# Patient Record
Sex: MALE | Race: WHITE | NOT HISPANIC OR LATINO | URBAN - METROPOLITAN AREA
[De-identification: names, ages, dates, MRNs, and addresses within clinical notes are randomized per-mention and may not be internally consistent; named-entity substitution may affect disease eponyms.]

---

## 2023-05-27 ENCOUNTER — EMERGENCY (EMERGENCY)
Facility: HOSPITAL | Age: 38
LOS: 1 days | Discharge: HOME | End: 2023-05-27
Attending: EMERGENCY MEDICINE | Admitting: EMERGENCY MEDICINE
Payer: MEDICAID

## 2023-05-27 VITALS
RESPIRATION RATE: 18 BRPM | SYSTOLIC BLOOD PRESSURE: 137 MMHG | DIASTOLIC BLOOD PRESSURE: 88 MMHG | HEIGHT: 67 IN | WEIGHT: 199.96 LBS | TEMPERATURE: 98 F | OXYGEN SATURATION: 99 % | HEART RATE: 85 BPM

## 2023-05-27 PROCEDURE — 99283 EMERGENCY DEPT VISIT LOW MDM: CPT | Mod: 25

## 2023-05-27 PROCEDURE — 23650 CLTX SHO DSLC W/MNPJ WO ANES: CPT | Mod: 54

## 2023-05-27 PROCEDURE — 73030 X-RAY EXAM OF SHOULDER: CPT | Mod: 26,LT,76

## 2023-05-27 PROCEDURE — 23650 CLTX SHO DSLC W/MNPJ WO ANES: CPT | Mod: LT

## 2023-05-27 PROCEDURE — 73030 X-RAY EXAM OF SHOULDER: CPT

## 2023-05-27 PROCEDURE — 99284 EMERGENCY DEPT VISIT MOD MDM: CPT | Mod: 57

## 2023-05-27 RX ORDER — IBUPROFEN 200 MG
600 TABLET ORAL ONCE
Refills: 0 | Status: COMPLETED | OUTPATIENT
Start: 2023-05-27 | End: 2023-05-27

## 2023-05-27 RX ORDER — OXYCODONE HYDROCHLORIDE 5 MG/1
10 TABLET ORAL ONCE
Refills: 0 | Status: DISCONTINUED | OUTPATIENT
Start: 2023-05-27 | End: 2023-05-27

## 2023-05-27 RX ORDER — ONDANSETRON 8 MG/1
4 TABLET, FILM COATED ORAL ONCE
Refills: 0 | Status: COMPLETED | OUTPATIENT
Start: 2023-05-27 | End: 2023-05-27

## 2023-05-27 RX ADMIN — OXYCODONE HYDROCHLORIDE 10 MILLIGRAM(S): 5 TABLET ORAL at 07:38

## 2023-05-27 RX ADMIN — Medication 600 MILLIGRAM(S): at 10:18

## 2023-05-27 RX ADMIN — Medication 600 MILLIGRAM(S): at 07:38

## 2023-05-27 RX ADMIN — OXYCODONE HYDROCHLORIDE 10 MILLIGRAM(S): 5 TABLET ORAL at 10:18

## 2023-05-27 RX ADMIN — ONDANSETRON 4 MILLIGRAM(S): 8 TABLET, FILM COATED ORAL at 07:40

## 2023-05-27 NOTE — ED PROVIDER NOTE - NSFOLLOWUPINSTRUCTIONS_ED_ALL_ED_FT
Follow up with orthopedics, call to make appointment.    A shoulder dislocation happens when the upper arm bone (humerus) moves out of the shoulder joint. The shoulder joint is the part of the shoulder where the humerus, shoulder blade (scapula), and collarbone (clavicle) meet.    What are the causes?  This condition is often caused by:  A fall.  A hard, direct hit to the shoulder.  A forceful movement of the shoulder.  What increases the risk?  You are more likely to develop this condition if you play sports.    What are the signs or symptoms?  The upper body showing a dislocated shoulder.  Symptoms of this condition include:  Deformity of the shoulder.  Severe pain.  Inability to move the shoulder.  Numbness, weakness, or tingling in your neck or down your arm.  Bruising or swelling around your shoulder.  How is this diagnosed?  This condition is diagnosed with a physical exam. After the exam, tests may be done to check for related problems. Tests may include:  X-ray. This may be done to check for broken bones.  MRI. This may be done to check for damage to the tissues around the shoulder.  Electromyogram. This may be done to check for nerve damage.  How is this treated?  This condition is treated with a procedure to place the humerus back in the joint. This procedure is called a reduction. There are two types of reduction:  Closed reduction. The humerus is placed back in the joint without surgery. The health care provider uses his or her hands to guide the bone back into place.  Open reduction. Surgery is done to place the humerus back in the joint. An open reduction may be recommended if:  You have a weak shoulder joint or weak ligaments.  You have had more than one shoulder dislocation.  The nerves or blood vessels around your shoulder have been damaged.  After reduction, your shoulder and arm will be placed in a brace or sling to prevent it from moving.    After the brace or sling is removed, you will have physical therapy to help improve the range of motion in your shoulder joint.    Follow these instructions at home:  Medicines    Take over-the-counter and prescription medicines only as told by your health care provider.  Ask your health care provider if the medicine prescribed to you:  Requires you to avoid driving or using machinery.  Can cause constipation. You may need to take these actions to prevent or treat constipation:  Drink enough fluid to keep your urine pale yellow.  Take over-the-counter or prescription medicines.  Eat foods that are high in fiber, such as beans, whole grains, and fresh fruits and vegetables.  Limit foods that are high in fat and processed sugars, such as fried or sweet foods.  If you have a brace or sling:    Wear the brace or sling as told by your health care provider. Remove it only as told by your health care provider.  Loosen the brace or sling if your fingers tingle, become numb, or turn cold and blue.  Keep the brace or sling clean.  If the brace or sling is not waterproof:  Do not let it get wet.  Cover it with a watertight covering when you take a bath or shower.  Managing pain, stiffness, and swelling    Bag of ice on a towel on the skin.  If directed, put ice on the injured area.  If you have a removable brace or sling, remove it as told by your health care provider.  Put ice in a plastic bag.  Place a towel between your skin and the bag.  Leave the ice on for 20 minutes, 2–3 times per day.  Move your fingers often to reduce stiffness and swelling.  Raise (elevate) the injured area above the level of your heart while you are sitting or lying down.  Activity    Do not lift your arm above shoulder level until your health care provider approves.  Do not lift anything until your health care provider says that it is safe.  Do not push or pull things until your health care provider approves.  Return to your normal activities as told by your health care provider. Ask your health care provider what activities are safe for you.  Perform range-of-motion exercises only as told by your health care provider.  Exercise your hand by squeezing a soft ball. This helps to decrease stiffness and swelling in your hand and wrist.  General instructions    Do not drive while wearing a brace or sling on a hand that you use for driving. Ask your health care provider when it is safe to drive after the brace or sling is removed.  Do not take baths, swim, or use a hot tub until your health care provider approves. Ask your health care provider if you may take showers. You may only be allowed to take sponge baths.  Do not use any products that contain nicotine or tobacco, such as cigarettes, e-cigarettes, and chewing tobacco. These can delay healing. If you need help quitting, ask your health care provider.  Keep all follow-up visits as told by your health care provider. This is important.  Contact a health care provider if:  Your brace or sling gets damaged.  Get help right away if:  Your pain gets worse rather than better.  You lose feeling in your arm or hand.  Your arm or hand becomes white and cold.  Summary  A shoulder dislocation happens when the upper arm bone moves out of the shoulder joint.  It is usually caused by a fall, a strong hit to the shoulder, or a forceful movement of the shoulder.  It causes severe pain, inability to move the shoulder, numbness, weakness, or tingling.  This condition is treated with either closed or open reduction. You will also be given a brace or sling. You will do exercises to increase your shoulder's range of motion.  Contact a health care provider if your brace or sling gets damaged. Get help right away if your pain gets worse, you lose feeling in your arm or hand, or your arm or hand becomes white or cold.  This information is not intended to replace advice given to you by your health care provider. Make sure you discuss any questions you have with your health care provider.    Document Revised: 09/07/2022 Document Reviewed: 09/07/2022

## 2023-05-27 NOTE — ED PROVIDER NOTE - PROGRESS NOTE DETAILS
patient feeling better, shoulder reduced, told to f/u with orthopedics, no heavy lifting, wear sling

## 2023-05-27 NOTE — ED ADULT TRIAGE NOTE - GLASGOW COMA SCALE: BEST VERBAL RESPONSE, MLM
Left message for Gavin Garcia at [facility] ([phone]) regarding approval needed by the pharmacy for methotrexate. Left pharmacy information. Call office back with questions. (V5) oriented

## 2023-05-27 NOTE — ED ADULT NURSE NOTE - OBJECTIVE STATEMENT
the patient presents to the er with complaints of left shoulder pain.  he lifted a large water bottle and felt his left shoulder pop out.  denies prior injury.

## 2023-05-27 NOTE — ED ADULT TRIAGE NOTE - STATUS:
REPORT RECEIVED FROM ER NURSE.PT ARRIVED TO ROOM 103 VIA STRETCHER FROM ER. PT IN PAIN 6/10. 
PT ORIENTED TO ROOM, USE OF CALL BELL. POLICIES AND PROCEDURES EXPLAINED. PT VERBALIZED 
UNDERSTANDING. A/O X 3-4. ON ROOM AIR. O2 SAT AT 98%. PT HAS AN IV ON RT HAND. NEEDS 
ATTENDED. WILL ENDORSE TO NEXT SHIFT NURSE FOR CONTINUITY OF CARE. Applied

## 2023-05-27 NOTE — ED ADULT NURSE NOTE - CHIEF COMPLAINT QUOTE
39 y/o male received ambulatory to triage. Alert and oriented x4. States "I fell onto my left arm because I picked up big gallons of water and I think I dislocated my shoulder." Pt denies any LOC or hitting head. Denies any cp, sob, n/v/d, dizziness, headache, fever/chills.

## 2023-05-27 NOTE — ED PROVIDER NOTE - CARE PROVIDER_API CALL
Bam Jurado  Orthopaedic Surgery  [address], [address]  [place] [address]  Phone: [phone]  Fax: [phone]  Follow Up Time:

## 2023-05-27 NOTE — ED PROVIDER NOTE - PHYSICAL EXAMINATION
Patient able to flex and extend wrist, able to AB duct thumb, able to oppose index finger and thumb and pinky finger and thumb, sensation intact

## 2023-05-27 NOTE — ED ADULT NURSE NOTE - CAS TRG GENERAL NORM CIRC DET
Father  Still living? Unknown  Family history of diabetes mellitus, Age at diagnosis: Age Unknown  Family history of hypertension, Age at diagnosis: Age Unknown  Family history of hyperlipidemia, Age at diagnosis: Age Unknown     Mother  Still living? Unknown  Family history of diabetes mellitus, Age at diagnosis: Age Unknown  Family history of hypertension, Age at diagnosis: Age Unknown  Family history of hyperlipidemia, Age at diagnosis: Age Unknown
Strong peripheral pulses

## 2023-05-27 NOTE — ED ADULT TRIAGE NOTE - CHIEF COMPLAINT QUOTE
37 y/o male received ambulatory to triage. Alert and oriented x4. States "I fell onto my left arm because I picked up big gallons of water and I think I dislocated my shoulder." Pt denies any LOC or hitting head. Denies any cp, sob, n/v/d, dizziness, headache, fever/chills.

## 2023-05-27 NOTE — ED PROVIDER NOTE - PATIENT PORTAL LINK FT
You can access the FollowMyHealth Patient Portal offered by [facility] by registering at the following website: [email or web address]. By joining [email or web address]’s FollowMyHealth portal, you will also be able to view your health information using other applications (apps) compatible with our system.

## 2023-05-27 NOTE — ED PROVIDER NOTE - OBJECTIVE STATEMENT
38-year-old male with no significant past medical history presents to the emergency department complaining of severe pain to his left shoulder, states that 1 hour prior to arrival he was lifting heavy gallons of water, patient states he did not take anything for pain prior to arrival.